# Patient Record
Sex: MALE | Race: WHITE
[De-identification: names, ages, dates, MRNs, and addresses within clinical notes are randomized per-mention and may not be internally consistent; named-entity substitution may affect disease eponyms.]

---

## 2017-01-26 ENCOUNTER — HOSPITAL ENCOUNTER (INPATIENT)
Dept: HOSPITAL 71 - SURG | Age: 32
LOS: 3 days | Discharge: ANOTHER HOSPITAL | DRG: 669 | End: 2017-01-29
Attending: UROLOGY | Admitting: UROLOGY
Payer: MEDICAID

## 2017-01-26 VITALS — RESPIRATION RATE: 22 BRPM | SYSTOLIC BLOOD PRESSURE: 147 MMHG

## 2017-01-26 VITALS — RESPIRATION RATE: 21 BRPM | SYSTOLIC BLOOD PRESSURE: 136 MMHG | TEMPERATURE: 96.9 F

## 2017-01-26 VITALS — SYSTOLIC BLOOD PRESSURE: 138 MMHG | RESPIRATION RATE: 22 BRPM | TEMPERATURE: 97.1 F

## 2017-01-26 VITALS — RESPIRATION RATE: 22 BRPM | TEMPERATURE: 98.1 F | SYSTOLIC BLOOD PRESSURE: 128 MMHG

## 2017-01-26 VITALS — RESPIRATION RATE: 20 BRPM | SYSTOLIC BLOOD PRESSURE: 152 MMHG | TEMPERATURE: 97.4 F

## 2017-01-26 VITALS — SYSTOLIC BLOOD PRESSURE: 183 MMHG | RESPIRATION RATE: 24 BRPM

## 2017-01-26 VITALS — TEMPERATURE: 97.8 F | SYSTOLIC BLOOD PRESSURE: 149 MMHG | RESPIRATION RATE: 20 BRPM

## 2017-01-26 VITALS
WEIGHT: 315 LBS | BODY MASS INDEX: 42.66 KG/M2 | HEIGHT: 72 IN | HEIGHT: 72 IN | BODY MASS INDEX: 42.66 KG/M2 | BODY MASS INDEX: 42.66 KG/M2 | WEIGHT: 315 LBS

## 2017-01-26 VITALS — SYSTOLIC BLOOD PRESSURE: 157 MMHG | RESPIRATION RATE: 20 BRPM

## 2017-01-26 VITALS — RESPIRATION RATE: 27 BRPM | SYSTOLIC BLOOD PRESSURE: 134 MMHG

## 2017-01-26 VITALS — TEMPERATURE: 97.4 F | SYSTOLIC BLOOD PRESSURE: 175 MMHG | RESPIRATION RATE: 18 BRPM

## 2017-01-26 VITALS — RESPIRATION RATE: 19 BRPM | SYSTOLIC BLOOD PRESSURE: 138 MMHG | TEMPERATURE: 97.6 F

## 2017-01-26 VITALS — TEMPERATURE: 98.7 F | SYSTOLIC BLOOD PRESSURE: 144 MMHG | RESPIRATION RATE: 22 BRPM

## 2017-01-26 VITALS — SYSTOLIC BLOOD PRESSURE: 153 MMHG | RESPIRATION RATE: 17 BRPM

## 2017-01-26 DIAGNOSIS — G47.30: ICD-10-CM

## 2017-01-26 DIAGNOSIS — D49.4: ICD-10-CM

## 2017-01-26 DIAGNOSIS — R31.0: ICD-10-CM

## 2017-01-26 DIAGNOSIS — E66.01: ICD-10-CM

## 2017-01-26 DIAGNOSIS — N32.89: Primary | ICD-10-CM

## 2017-01-26 DIAGNOSIS — F17.200: ICD-10-CM

## 2017-01-26 PROCEDURE — 88305 TISSUE EXAM BY PATHOLOGIST: CPT

## 2017-01-26 PROCEDURE — 94799 UNLISTED PULMONARY SVC/PX: CPT

## 2017-01-26 PROCEDURE — 80048 BASIC METABOLIC PNL TOTAL CA: CPT

## 2017-01-26 PROCEDURE — 85025 COMPLETE CBC W/AUTO DIFF WBC: CPT

## 2017-01-26 PROCEDURE — 0TBB8ZZ EXCISION OF BLADDER, VIA NATURAL OR ARTIFICIAL OPENING ENDOSCOPIC: ICD-10-PCS | Performed by: UROLOGY

## 2017-01-26 PROCEDURE — 88307 TISSUE EXAM BY PATHOLOGIST: CPT

## 2017-01-26 PROCEDURE — 94762 N-INVAS EAR/PLS OXIMTRY CONT: CPT

## 2017-01-26 RX ADMIN — HYDROMORPHONE HYDROCHLORIDE PRN MG: 1 INJECTION, SOLUTION INTRAMUSCULAR; INTRAVENOUS; SUBCUTANEOUS at 22:17

## 2017-01-26 RX ADMIN — DOCUSATE SODIUM SCH MG: 100 CAPSULE, LIQUID FILLED ORAL at 19:42

## 2017-01-26 RX ADMIN — TAMSULOSIN HYDROCHLORIDE SCH MG: 0.4 CAPSULE ORAL at 19:41

## 2017-01-26 RX ADMIN — LEVOFLOXACIN ONE MLS/HR: 5 INJECTION, SOLUTION INTRAVENOUS at 11:59

## 2017-01-26 RX ADMIN — OXYBUTYNIN CHLORIDE SCH MG: 5 TABLET ORAL at 19:41

## 2017-01-26 RX ADMIN — HYDROMORPHONE HYDROCHLORIDE PRN MG: 1 INJECTION, SOLUTION INTRAMUSCULAR; INTRAVENOUS; SUBCUTANEOUS at 16:05

## 2017-01-26 RX ADMIN — HYDROMORPHONE HYDROCHLORIDE PRN MG: 1 INJECTION, SOLUTION INTRAMUSCULAR; INTRAVENOUS; SUBCUTANEOUS at 16:17

## 2017-01-27 VITALS — TEMPERATURE: 97.7 F | RESPIRATION RATE: 15 BRPM | SYSTOLIC BLOOD PRESSURE: 145 MMHG

## 2017-01-27 VITALS — RESPIRATION RATE: 16 BRPM | SYSTOLIC BLOOD PRESSURE: 146 MMHG | TEMPERATURE: 97.4 F

## 2017-01-27 VITALS — SYSTOLIC BLOOD PRESSURE: 150 MMHG | TEMPERATURE: 97.4 F | RESPIRATION RATE: 18 BRPM

## 2017-01-27 VITALS — SYSTOLIC BLOOD PRESSURE: 132 MMHG | RESPIRATION RATE: 16 BRPM | TEMPERATURE: 97.5 F

## 2017-01-27 VITALS — TEMPERATURE: 98.1 F | RESPIRATION RATE: 15 BRPM | SYSTOLIC BLOOD PRESSURE: 151 MMHG

## 2017-01-27 VITALS — RESPIRATION RATE: 15 BRPM | SYSTOLIC BLOOD PRESSURE: 159 MMHG | TEMPERATURE: 97.9 F

## 2017-01-27 VITALS — RESPIRATION RATE: 22 BRPM

## 2017-01-27 RX ADMIN — HYDROMORPHONE HYDROCHLORIDE PRN MG: 1 INJECTION, SOLUTION INTRAMUSCULAR; INTRAVENOUS; SUBCUTANEOUS at 19:41

## 2017-01-27 RX ADMIN — OXYBUTYNIN CHLORIDE SCH MG: 5 TABLET ORAL at 19:40

## 2017-01-27 RX ADMIN — NICOTINE POLACRILEX PRN MG: 2 GUM, CHEWING ORAL at 18:01

## 2017-01-27 RX ADMIN — FLUOXETINE SCH MG: 20 CAPSULE ORAL at 07:59

## 2017-01-27 RX ADMIN — STANDARDIZED SENNA CONCENTRATE SCH MG: 8.6 TABLET ORAL at 18:00

## 2017-01-27 RX ADMIN — HYDROMORPHONE HYDROCHLORIDE PRN MG: 1 INJECTION, SOLUTION INTRAMUSCULAR; INTRAVENOUS; SUBCUTANEOUS at 23:26

## 2017-01-27 RX ADMIN — OXYBUTYNIN CHLORIDE SCH MG: 5 TABLET ORAL at 07:59

## 2017-01-27 RX ADMIN — DOCUSATE SODIUM SCH MG: 100 CAPSULE, LIQUID FILLED ORAL at 19:40

## 2017-01-27 RX ADMIN — OXYCODONE HYDROCHLORIDE AND ACETAMINOPHEN PRN TAB: 7.5; 325 TABLET ORAL at 18:53

## 2017-01-27 RX ADMIN — TAMSULOSIN HYDROCHLORIDE SCH MG: 0.4 CAPSULE ORAL at 19:40

## 2017-01-27 RX ADMIN — HYDROMORPHONE HYDROCHLORIDE PRN MG: 1 INJECTION, SOLUTION INTRAMUSCULAR; INTRAVENOUS; SUBCUTANEOUS at 17:24

## 2017-01-27 RX ADMIN — HYDROMORPHONE HYDROCHLORIDE PRN MG: 1 INJECTION, SOLUTION INTRAMUSCULAR; INTRAVENOUS; SUBCUTANEOUS at 00:19

## 2017-01-27 RX ADMIN — OXYCODONE HYDROCHLORIDE AND ACETAMINOPHEN PRN TAB: 7.5; 325 TABLET ORAL at 23:13

## 2017-01-27 RX ADMIN — DOCUSATE SODIUM SCH MG: 100 CAPSULE, LIQUID FILLED ORAL at 07:59

## 2017-01-27 RX ADMIN — HYDROMORPHONE HYDROCHLORIDE PRN MG: 1 INJECTION, SOLUTION INTRAMUSCULAR; INTRAVENOUS; SUBCUTANEOUS at 21:32

## 2017-01-27 RX ADMIN — HYDROMORPHONE HYDROCHLORIDE PRN MG: 1 INJECTION, SOLUTION INTRAMUSCULAR; INTRAVENOUS; SUBCUTANEOUS at 07:13

## 2017-01-27 RX ADMIN — HYDROMORPHONE HYDROCHLORIDE PRN MG: 1 INJECTION, SOLUTION INTRAMUSCULAR; INTRAVENOUS; SUBCUTANEOUS at 13:25

## 2017-01-27 RX ADMIN — HYDROMORPHONE HYDROCHLORIDE PRN MG: 1 INJECTION, SOLUTION INTRAMUSCULAR; INTRAVENOUS; SUBCUTANEOUS at 02:44

## 2017-01-27 RX ADMIN — OXYBUTYNIN CHLORIDE SCH MG: 5 TABLET ORAL at 14:46

## 2017-01-27 RX ADMIN — HYDROMORPHONE HYDROCHLORIDE PRN MG: 1 INJECTION, SOLUTION INTRAMUSCULAR; INTRAVENOUS; SUBCUTANEOUS at 09:26

## 2017-01-27 RX ADMIN — HYDROMORPHONE HYDROCHLORIDE PRN MG: 1 INJECTION, SOLUTION INTRAMUSCULAR; INTRAVENOUS; SUBCUTANEOUS at 11:17

## 2017-01-27 RX ADMIN — HYDROMORPHONE HYDROCHLORIDE PRN MG: 1 INJECTION, SOLUTION INTRAMUSCULAR; INTRAVENOUS; SUBCUTANEOUS at 04:52

## 2017-01-27 RX ADMIN — HYDROMORPHONE HYDROCHLORIDE PRN MG: 1 INJECTION, SOLUTION INTRAMUSCULAR; INTRAVENOUS; SUBCUTANEOUS at 15:22

## 2017-01-27 RX ADMIN — LEVOFLOXACIN SCH MG: 500 TABLET, FILM COATED ORAL at 09:30

## 2017-01-28 VITALS — RESPIRATION RATE: 20 BRPM | TEMPERATURE: 98.1 F | SYSTOLIC BLOOD PRESSURE: 148 MMHG

## 2017-01-28 VITALS — TEMPERATURE: 97.3 F | RESPIRATION RATE: 20 BRPM | SYSTOLIC BLOOD PRESSURE: 159 MMHG

## 2017-01-28 VITALS — TEMPERATURE: 97.4 F | SYSTOLIC BLOOD PRESSURE: 132 MMHG | RESPIRATION RATE: 16 BRPM

## 2017-01-28 VITALS — TEMPERATURE: 97.7 F | RESPIRATION RATE: 18 BRPM | SYSTOLIC BLOOD PRESSURE: 117 MMHG

## 2017-01-28 VITALS — SYSTOLIC BLOOD PRESSURE: 154 MMHG | TEMPERATURE: 97.5 F | RESPIRATION RATE: 18 BRPM

## 2017-01-28 VITALS — RESPIRATION RATE: 18 BRPM | TEMPERATURE: 97.5 F | SYSTOLIC BLOOD PRESSURE: 154 MMHG

## 2017-01-28 RX ADMIN — OXYCODONE HYDROCHLORIDE AND ACETAMINOPHEN PRN TAB: 7.5; 325 TABLET ORAL at 03:09

## 2017-01-28 RX ADMIN — HYDROMORPHONE HYDROCHLORIDE PRN MG: 1 INJECTION, SOLUTION INTRAMUSCULAR; INTRAVENOUS; SUBCUTANEOUS at 03:34

## 2017-01-28 RX ADMIN — HYDROMORPHONE HYDROCHLORIDE PRN MG: 1 INJECTION, SOLUTION INTRAMUSCULAR; INTRAVENOUS; SUBCUTANEOUS at 09:53

## 2017-01-28 RX ADMIN — OXYBUTYNIN CHLORIDE SCH MG: 5 TABLET ORAL at 16:12

## 2017-01-28 RX ADMIN — OXYCODONE AND ACETAMINOPHEN PRN TAB: 10; 325 TABLET ORAL at 08:49

## 2017-01-28 RX ADMIN — FLUOXETINE SCH MG: 20 CAPSULE ORAL at 08:49

## 2017-01-28 RX ADMIN — DOCUSATE SODIUM SCH MG: 100 CAPSULE, LIQUID FILLED ORAL at 08:48

## 2017-01-28 RX ADMIN — HYDROMORPHONE HYDROCHLORIDE PRN MG: 1 INJECTION, SOLUTION INTRAMUSCULAR; INTRAVENOUS; SUBCUTANEOUS at 13:57

## 2017-01-28 RX ADMIN — NICOTINE POLACRILEX PRN MG: 2 GUM, CHEWING ORAL at 08:49

## 2017-01-28 RX ADMIN — OXYCODONE AND ACETAMINOPHEN PRN TAB: 10; 325 TABLET ORAL at 19:47

## 2017-01-28 RX ADMIN — LEVOFLOXACIN SCH MG: 500 TABLET, FILM COATED ORAL at 08:48

## 2017-01-28 RX ADMIN — TAMSULOSIN HYDROCHLORIDE SCH MG: 0.4 CAPSULE ORAL at 19:40

## 2017-01-28 RX ADMIN — HYDROMORPHONE HYDROCHLORIDE PRN MG: 1 INJECTION, SOLUTION INTRAMUSCULAR; INTRAVENOUS; SUBCUTANEOUS at 20:35

## 2017-01-28 RX ADMIN — HYDROMORPHONE HYDROCHLORIDE PRN MG: 1 INJECTION, SOLUTION INTRAMUSCULAR; INTRAVENOUS; SUBCUTANEOUS at 01:30

## 2017-01-28 RX ADMIN — OXYCODONE AND ACETAMINOPHEN PRN TAB: 10; 325 TABLET ORAL at 14:44

## 2017-01-28 RX ADMIN — STANDARDIZED SENNA CONCENTRATE SCH MG: 8.6 TABLET ORAL at 08:48

## 2017-01-28 RX ADMIN — HYDROMORPHONE HYDROCHLORIDE PRN MG: 1 INJECTION, SOLUTION INTRAMUSCULAR; INTRAVENOUS; SUBCUTANEOUS at 22:28

## 2017-01-28 RX ADMIN — HYDROMORPHONE HYDROCHLORIDE PRN MG: 1 INJECTION, SOLUTION INTRAMUSCULAR; INTRAVENOUS; SUBCUTANEOUS at 18:02

## 2017-01-28 RX ADMIN — HYDROMORPHONE HYDROCHLORIDE PRN MG: 1 INJECTION, SOLUTION INTRAMUSCULAR; INTRAVENOUS; SUBCUTANEOUS at 16:12

## 2017-01-28 RX ADMIN — OXYBUTYNIN CHLORIDE SCH MG: 5 TABLET ORAL at 08:48

## 2017-01-28 RX ADMIN — MAGNESIUM HYDROXIDE SCH ML: 400 SUSPENSION ORAL at 08:55

## 2017-01-28 RX ADMIN — HYDROMORPHONE HYDROCHLORIDE PRN MG: 1 INJECTION, SOLUTION INTRAMUSCULAR; INTRAVENOUS; SUBCUTANEOUS at 11:47

## 2017-01-28 RX ADMIN — NICOTINE POLACRILEX PRN MG: 2 GUM, CHEWING ORAL at 03:11

## 2017-01-28 RX ADMIN — HYDROMORPHONE HYDROCHLORIDE PRN MG: 1 INJECTION, SOLUTION INTRAMUSCULAR; INTRAVENOUS; SUBCUTANEOUS at 05:49

## 2017-01-28 RX ADMIN — DOCUSATE SODIUM SCH MG: 100 CAPSULE, LIQUID FILLED ORAL at 19:40

## 2017-01-28 RX ADMIN — OXYBUTYNIN CHLORIDE SCH MG: 5 TABLET ORAL at 19:40

## 2017-01-28 RX ADMIN — HYDROMORPHONE HYDROCHLORIDE PRN MG: 1 INJECTION, SOLUTION INTRAMUSCULAR; INTRAVENOUS; SUBCUTANEOUS at 07:41

## 2017-01-29 VITALS — SYSTOLIC BLOOD PRESSURE: 154 MMHG | RESPIRATION RATE: 20 BRPM | TEMPERATURE: 97.9 F

## 2017-01-29 VITALS — TEMPERATURE: 97.4 F | SYSTOLIC BLOOD PRESSURE: 136 MMHG | RESPIRATION RATE: 16 BRPM

## 2017-01-29 VITALS — SYSTOLIC BLOOD PRESSURE: 163 MMHG | RESPIRATION RATE: 20 BRPM | TEMPERATURE: 97.7 F

## 2017-01-29 VITALS — SYSTOLIC BLOOD PRESSURE: 142 MMHG | TEMPERATURE: 97.5 F | RESPIRATION RATE: 18 BRPM

## 2017-01-29 VITALS — TEMPERATURE: 97.7 F | SYSTOLIC BLOOD PRESSURE: 163 MMHG | RESPIRATION RATE: 16 BRPM

## 2017-01-29 VITALS — RESPIRATION RATE: 16 BRPM

## 2017-01-29 VITALS — RESPIRATION RATE: 20 BRPM

## 2017-01-29 RX ADMIN — OXYBUTYNIN CHLORIDE SCH MG: 5 TABLET ORAL at 09:00

## 2017-01-29 RX ADMIN — HYDROMORPHONE HYDROCHLORIDE PRN MG: 1 INJECTION, SOLUTION INTRAMUSCULAR; INTRAVENOUS; SUBCUTANEOUS at 09:11

## 2017-01-29 RX ADMIN — HYDROMORPHONE HYDROCHLORIDE PRN MG: 1 INJECTION, SOLUTION INTRAMUSCULAR; INTRAVENOUS; SUBCUTANEOUS at 11:34

## 2017-01-29 RX ADMIN — HYDROMORPHONE HYDROCHLORIDE PRN MG: 1 INJECTION, SOLUTION INTRAMUSCULAR; INTRAVENOUS; SUBCUTANEOUS at 10:37

## 2017-01-29 RX ADMIN — HYDROMORPHONE HYDROCHLORIDE PRN MG: 1 INJECTION, SOLUTION INTRAMUSCULAR; INTRAVENOUS; SUBCUTANEOUS at 06:17

## 2017-01-29 RX ADMIN — OXYCODONE AND ACETAMINOPHEN PRN TAB: 10; 325 TABLET ORAL at 01:57

## 2017-01-29 RX ADMIN — DOCUSATE SODIUM SCH MG: 100 CAPSULE, LIQUID FILLED ORAL at 09:00

## 2017-01-29 RX ADMIN — LEVOFLOXACIN SCH MG: 500 TABLET, FILM COATED ORAL at 09:00

## 2017-01-29 RX ADMIN — MAGNESIUM HYDROXIDE SCH ML: 400 SUSPENSION ORAL at 09:00

## 2017-01-29 RX ADMIN — STANDARDIZED SENNA CONCENTRATE SCH MG: 8.6 TABLET ORAL at 09:00

## 2017-01-29 RX ADMIN — HYDROMORPHONE HYDROCHLORIDE PRN MG: 1 INJECTION, SOLUTION INTRAMUSCULAR; INTRAVENOUS; SUBCUTANEOUS at 04:12

## 2017-01-29 RX ADMIN — HYDROMORPHONE HYDROCHLORIDE PRN MG: 1 INJECTION, SOLUTION INTRAMUSCULAR; INTRAVENOUS; SUBCUTANEOUS at 07:14

## 2017-01-29 RX ADMIN — HYDROMORPHONE HYDROCHLORIDE PRN MG: 1 INJECTION, SOLUTION INTRAMUSCULAR; INTRAVENOUS; SUBCUTANEOUS at 00:58

## 2017-01-29 RX ADMIN — FLUOXETINE SCH MG: 20 CAPSULE ORAL at 09:00

## 2018-07-25 ENCOUNTER — OFFICE VISIT CONVERTED (OUTPATIENT)
Dept: ONCOLOGY | Facility: HOSPITAL | Age: 33
End: 2018-07-25
Attending: INTERNAL MEDICINE

## 2019-01-17 ENCOUNTER — HOSPITAL ENCOUNTER (OUTPATIENT)
Dept: OTHER | Facility: HOSPITAL | Age: 34
Discharge: HOME | End: 2019-01-17
Attending: UROLOGY

## 2019-01-17 LAB
CREAT BLD-MCNC: 1.5 MG/DL (ref 0.6–1.4)
GFR SERPLBLD BASED ON 1.73 SQ M-ARVRAT: >60 ML/MIN/{1.73_M2}

## 2019-03-21 ENCOUNTER — OFFICE VISIT CONVERTED (OUTPATIENT)
Dept: SURGERY | Facility: CLINIC | Age: 34
End: 2019-03-21
Attending: SURGERY

## 2019-04-01 ENCOUNTER — HOSPITAL ENCOUNTER (OUTPATIENT)
Dept: PERIOP | Facility: HOSPITAL | Age: 34
Setting detail: HOSPITAL OUTPATIENT SURGERY
Discharge: HOME | End: 2019-04-03
Attending: SURGERY

## 2019-04-01 LAB
ANION GAP SERPL CALC-SCNC: 17 MMOL/L (ref 8–19)
BASE EXCESS BLD CALC-SCNC: -7.2 MMOL/L
BASE EXCESS BLD CALC-SCNC: -7.9 MMOL/L
BASE EXCESS BLD CALC-SCNC: -8.6 MMOL/L
BASOPHILS # BLD AUTO: 0.04 10*3/UL (ref 0–0.2)
BASOPHILS NFR BLD AUTO: 0.6 % (ref 0–3)
BNP SERPL-MCNC: 46 PG/ML (ref 0–450)
BUN SERPL-MCNC: 21 MG/DL (ref 5–25)
BUN/CREAT SERPL: 14 {RATIO} (ref 6–20)
CALCIUM SERPL-MCNC: 8.8 MG/DL (ref 8.7–10.4)
CHLORIDE BLDA-SCNC: 103 MMOL/L (ref 98–106)
CHLORIDE SERPL-SCNC: 104 MMOL/L (ref 99–111)
COHGB MFR BLD: 2.2 % (ref 0–1.5)
COHGB MFR BLD: 2.6 % (ref 0–1.5)
COHGB MFR BLD: 2.8 % (ref 0–1.5)
CONV ABS IMM GRAN: 0.05 10*3/UL (ref 0–0.2)
CONV ALLEN'S TEST: ABNORMAL
CONV CO2: 22 MMOL/L (ref 22–32)
CONV E PRESSURE: 8 CM[H2O]
CONV E PRESSURE: 8 CM[H2O]
CONV FHHB: 5.3 % (ref 0–5)
CONV FHHB: 6 % (ref 0–5)
CONV FHHB: 8.7 % (ref 0–5)
CONV FIO2: 25 % (ref 21–100)
CONV FIO2: 35 % (ref 21–100)
CONV FIO2: 40 % (ref 21–100)
CONV I PRESSURE: 16 CM[H2O]
CONV IMMATURE GRAN: 0.7 % (ref 0–1.8)
CONV POC IONIZED CALCIUM: 1.11 MMOL/L (ref 1.13–1.32)
CONV SITE: ABNORMAL
CREAT UR-MCNC: 1.49 MG/DL (ref 0.7–1.2)
D-LACTATE SERPL-SCNC: 0.96 MMOL/L (ref 0.5–2)
DEPRECATED RDW RBC AUTO: 50.7 FL (ref 35.1–43.9)
EOSINOPHIL # BLD AUTO: 0.16 10*3/UL (ref 0–0.7)
EOSINOPHIL # BLD AUTO: 2.2 % (ref 0–7)
ERYTHROCYTE [DISTWIDTH] IN BLOOD BY AUTOMATED COUNT: 14.8 % (ref 11.6–14.4)
GFR SERPLBLD BASED ON 1.73 SQ M-ARVRAT: >60 ML/MIN/{1.73_M2}
GLUCOSE BLD-MCNC: 103 MG/DL (ref 70–99)
GLUCOSE SERPL-MCNC: 99 MG/DL (ref 70–99)
HBA1C MFR BLD: 13.7 % (ref 13.8–16.4)
HBA1C MFR BLD: 13.7 G/DL (ref 14–18)
HBA1C MFR BLD: 14.4 % (ref 13.8–16.4)
HBA1C MFR BLD: 14.6 % (ref 13.8–16.4)
HCO3 BLDA-SCNC: 20.8 MMOL/L (ref 22–26)
HCO3 BLDA-SCNC: 21.3 MMOL/L (ref 22–26)
HCO3 BLDA-SCNC: 22.4 MMOL/L (ref 22–26)
HCT VFR BLD AUTO: 43.4 % (ref 42–52)
LABORATORY COMMENT REPORT: ABNORMAL
LITERS PER MINUTE: 10 L/MIN
LYMPHOCYTES # BLD AUTO: 2.06 10*3/UL (ref 1–5)
Lab: ABNORMAL
MCH RBC QN AUTO: 29.9 PG (ref 27–31)
MCHC RBC AUTO-ENTMCNC: 31.6 G/DL (ref 33–37)
MCV RBC AUTO: 94.8 FL (ref 80–96)
METHGB MFR BLD: 0 % (ref 0–1.5)
METHGB MFR BLD: 0.3 % (ref 0–1.5)
METHGB MFR BLD: 0.3 % (ref 0–1.5)
MONOCYTES # BLD AUTO: 0.47 10*3/UL (ref 0.2–1.2)
MONOCYTES NFR BLD AUTO: 6.5 % (ref 3–10)
NEUTROPHILS # BLD AUTO: 4.46 10*3/UL (ref 2–8)
NEUTROPHILS NFR BLD AUTO: 61.5 % (ref 30–85)
NRBC CBCN: 0 % (ref 0–0.7)
OSMOLALITY SERPL CALC.SUM OF ELEC: 289 MOSM/KG (ref 273–304)
OXYHGB MFR BLD: 89.1 % (ref 94–98)
OXYHGB MFR BLD: 90.9 % (ref 94–98)
OXYHGB MFR BLD: 91.8 % (ref 94–98)
PCO2 BLD: 52.3 MM[HG] (ref 35–45)
PCO2 BLD: 63.8 MM[HG] (ref 35–45)
PCO2 BLD: 67.4 MM[HG] (ref 35–45)
PH UR: 7.14 [PH] (ref 7.35–7.45)
PH UR: 7.14 [PH] (ref 7.35–7.45)
PH UR: 7.22 [PH] (ref 7.35–7.45)
PLATELET # BLD AUTO: 247 10*3/UL (ref 130–400)
PMV BLD AUTO: 9.2 FL (ref 9.4–12.4)
PO2 BLD: 223 MM[HG] (ref 0–500)
PO2 BLD: 241 MM[HG] (ref 0–500)
PO2 BLD: 260 MM[HG] (ref 0–500)
PO2 BLD: 64.9 MM[HG] (ref 80–100)
PO2 BLD: 84.2 MM[HG] (ref 80–100)
PO2 BLD: 89.3 MM[HG] (ref 80–100)
POTASSIUM BLDA-SCNC: 5.4 MMOL/L (ref 3.5–5)
POTASSIUM SERPL-SCNC: 4.6 MMOL/L (ref 3.5–5.3)
RBC # BLD AUTO: 4.58 10*6/UL (ref 4.7–6.1)
SAO2 % BLDCOA: 91.1 % (ref 95–99)
SAO2 % BLDCOA: 93.8 % (ref 95–99)
SAO2 % BLDCOA: 94.5 % (ref 95–99)
SODIUM BLD-SCNC: 135.1 MMOL/L (ref 136–146)
SODIUM SERPL-SCNC: 138 MMOL/L (ref 135–147)
SPECIMEN SOURCE: ABNORMAL
SPO2: 92 MM[HG] (ref 21–100)
SPO2: 94 MM[HG] (ref 21–100)
SPO2: 95 MM[HG] (ref 21–100)
T4 FREE SERPL-MCNC: 1.2 NG/DL (ref 0.9–1.8)
TSH SERPL-ACNC: 1.19 M[IU]/L (ref 0.27–4.2)
VARIANT LYMPHS NFR BLD MANUAL: 28.5 % (ref 20–45)
WBC # BLD AUTO: 7.24 10*3/UL (ref 4.8–10.8)

## 2019-04-02 LAB
ANION GAP SERPL CALC-SCNC: 16 MMOL/L (ref 8–19)
BUN SERPL-MCNC: 23 MG/DL (ref 5–25)
BUN/CREAT SERPL: 14 {RATIO} (ref 6–20)
CALCIUM SERPL-MCNC: 8.6 MG/DL (ref 8.7–10.4)
CHLORIDE SERPL-SCNC: 101 MMOL/L (ref 99–111)
CONV CO2: 20 MMOL/L (ref 22–32)
CREAT UR-MCNC: 1.61 MG/DL (ref 0.7–1.2)
GFR SERPLBLD BASED ON 1.73 SQ M-ARVRAT: 56 ML/MIN/{1.73_M2}
GLUCOSE SERPL-MCNC: 101 MG/DL (ref 70–99)
OSMOLALITY SERPL CALC.SUM OF ELEC: 280 MOSM/KG (ref 273–304)
POTASSIUM SERPL-SCNC: 4.4 MMOL/L (ref 3.5–5.3)
SODIUM SERPL-SCNC: 133 MMOL/L (ref 135–147)

## 2019-04-03 LAB
BASE EXCESS BLD CALC-SCNC: -9.2 MMOL/L
COHGB MFR BLD: 1.4 % (ref 0–1.5)
CONV ALLEN'S TEST: ABNORMAL
CONV E PRESSURE: 8 CM[H2O]
CONV FHHB: 5.7 % (ref 0–5)
CONV FIO2: 25 % (ref 21–100)
CONV SITE: ABNORMAL
HBA1C MFR BLD: 12.8 % (ref 13.8–16.4)
HCO3 BLDA-SCNC: 20.6 MMOL/L (ref 22–26)
Lab: ABNORMAL
METHGB MFR BLD: 0.1 % (ref 0–1.5)
OXYHGB MFR BLD: 92.8 % (ref 94–98)
PCO2 BLD: 63.5 MM[HG] (ref 35–45)
PH UR: 7.13 [PH] (ref 7.35–7.45)
PO2 BLD: 304 MM[HG] (ref 0–500)
PO2 BLD: 76.1 MM[HG] (ref 80–100)
SAO2 % BLDCOA: 94.2 % (ref 95–99)
SPECIMEN SOURCE: ABNORMAL

## 2019-04-17 ENCOUNTER — HOSPITAL ENCOUNTER (OUTPATIENT)
Dept: ONCOLOGY | Facility: HOSPITAL | Age: 34
Discharge: HOME | End: 2019-04-17
Attending: NURSE PRACTITIONER

## 2019-04-17 ENCOUNTER — OFFICE VISIT CONVERTED (OUTPATIENT)
Dept: ONCOLOGY | Facility: HOSPITAL | Age: 34
End: 2019-04-17
Attending: NURSE PRACTITIONER

## 2019-04-17 LAB
ALBUMIN SERPL-MCNC: 4 G/DL (ref 3.5–5)
ALBUMIN/GLOB SERPL: 1.2 {RATIO} (ref 1.4–2.6)
ALP SERPL-CCNC: 55 U/L (ref 53–128)
ALT SERPL-CCNC: 30 U/L (ref 10–40)
ANION GAP SERPL CALC-SCNC: 20 MMOL/L (ref 8–19)
AST SERPL-CCNC: 17 U/L (ref 15–50)
BILIRUB SERPL-MCNC: 0.15 MG/DL (ref 0.2–1.3)
BUN SERPL-MCNC: 27 MG/DL (ref 5–25)
BUN/CREAT SERPL: 20 {RATIO} (ref 6–20)
CALCIUM SERPL-MCNC: 9.6 MG/DL (ref 8.7–10.4)
CHLORIDE SERPL-SCNC: 100 MMOL/L (ref 99–111)
CONV CO2: 23 MMOL/L (ref 22–32)
CONV TOTAL PROTEIN: 7.4 G/DL (ref 6.3–8.2)
CREAT UR-MCNC: 1.37 MG/DL (ref 0.7–1.2)
GFR SERPLBLD BASED ON 1.73 SQ M-ARVRAT: >60 ML/MIN/{1.73_M2}
GLOBULIN UR ELPH-MCNC: 3.4 G/DL (ref 2–3.5)
GLUCOSE SERPL-MCNC: 122 MG/DL (ref 70–99)
OSMOLALITY SERPL CALC.SUM OF ELEC: 294 MOSM/KG (ref 273–304)
POTASSIUM SERPL-SCNC: 4.3 MMOL/L (ref 3.5–5.3)
SODIUM SERPL-SCNC: 139 MMOL/L (ref 135–147)

## 2019-11-12 ENCOUNTER — HOSPITAL ENCOUNTER (OUTPATIENT)
Dept: OTHER | Facility: HOSPITAL | Age: 34
Discharge: HOME | End: 2019-11-12
Attending: UROLOGY

## 2019-11-12 LAB
CREAT BLD-MCNC: 1.7 MG/DL (ref 0.6–1.4)
GFR SERPLBLD BASED ON 1.73 SQ M-ARVRAT: 52 ML/MIN/{1.73_M2}

## 2020-06-09 ENCOUNTER — HOSPITAL ENCOUNTER (OUTPATIENT)
Dept: CT IMAGING | Facility: HOSPITAL | Age: 35
Discharge: HOME | End: 2020-06-09
Attending: UROLOGY

## 2020-06-09 LAB
CREAT BLD-MCNC: 1.3 MG/DL (ref 0.6–1.4)
GFR SERPLBLD BASED ON 1.73 SQ M-ARVRAT: >60 ML/MIN/{1.73_M2}

## 2021-01-08 ENCOUNTER — HOSPITAL ENCOUNTER (OUTPATIENT)
Dept: OTHER | Facility: HOSPITAL | Age: 36
Discharge: HOME | End: 2021-01-08
Attending: FAMILY MEDICINE

## 2021-01-08 LAB
ALBUMIN SERPL-MCNC: 4 G/DL (ref 3.5–5)
ALBUMIN/GLOB SERPL: 1.4 {RATIO} (ref 1.4–2.6)
ALP SERPL-CCNC: 172 U/L (ref 53–128)
ALT SERPL-CCNC: 48 U/L (ref 10–40)
ANION GAP SERPL CALC-SCNC: 17 MMOL/L (ref 8–19)
AST SERPL-CCNC: 61 U/L (ref 15–50)
BASOPHILS # BLD AUTO: 0.06 10*3/UL (ref 0–0.2)
BASOPHILS NFR BLD AUTO: 0.6 % (ref 0–3)
BILIRUB SERPL-MCNC: 0.47 MG/DL (ref 0.2–1.3)
BUN SERPL-MCNC: 17 MG/DL (ref 5–25)
BUN/CREAT SERPL: 13 {RATIO} (ref 6–20)
CALCIUM SERPL-MCNC: 7.7 MG/DL (ref 8.7–10.4)
CHLORIDE SERPL-SCNC: 107 MMOL/L (ref 99–111)
CONV ABS IMM GRAN: 0.06 10*3/UL (ref 0–0.2)
CONV CO2: 19 MMOL/L (ref 22–32)
CONV IMMATURE GRAN: 0.6 % (ref 0–1.8)
CONV TOTAL PROTEIN: 6.9 G/DL (ref 6.3–8.2)
CREAT UR-MCNC: 1.32 MG/DL (ref 0.7–1.2)
DEPRECATED RDW RBC AUTO: 53 FL (ref 35.1–43.9)
EOSINOPHIL # BLD AUTO: 0.29 10*3/UL (ref 0–0.7)
EOSINOPHIL # BLD AUTO: 2.9 % (ref 0–7)
ERYTHROCYTE [DISTWIDTH] IN BLOOD BY AUTOMATED COUNT: 14.9 % (ref 11.6–14.4)
GFR SERPLBLD BASED ON 1.73 SQ M-ARVRAT: >60 ML/MIN/{1.73_M2}
GLOBULIN UR ELPH-MCNC: 2.9 G/DL (ref 2–3.5)
GLUCOSE SERPL-MCNC: 180 MG/DL (ref 70–99)
HCT VFR BLD AUTO: 36.6 % (ref 42–52)
HGB BLD-MCNC: 11.3 G/DL (ref 14–18)
LYMPHOCYTES # BLD AUTO: 1.66 10*3/UL (ref 1–5)
LYMPHOCYTES NFR BLD AUTO: 16.9 % (ref 20–45)
MCH RBC QN AUTO: 31 PG (ref 27–31)
MCHC RBC AUTO-ENTMCNC: 30.9 G/DL (ref 33–37)
MCV RBC AUTO: 100.3 FL (ref 80–96)
MONOCYTES # BLD AUTO: 0.44 10*3/UL (ref 0.2–1.2)
MONOCYTES NFR BLD AUTO: 4.5 % (ref 3–10)
NEUTROPHILS # BLD AUTO: 7.34 10*3/UL (ref 2–8)
NEUTROPHILS NFR BLD AUTO: 74.5 % (ref 30–85)
NRBC CBCN: 0 % (ref 0–0.7)
OSMOLALITY SERPL CALC.SUM OF ELEC: 296 MOSM/KG (ref 273–304)
PLATELET # BLD AUTO: 289 10*3/UL (ref 130–400)
PMV BLD AUTO: 10.5 FL (ref 9.4–12.4)
POTASSIUM SERPL-SCNC: 3.4 MMOL/L (ref 3.5–5.3)
RBC # BLD AUTO: 3.65 10*6/UL (ref 4.7–6.1)
SODIUM SERPL-SCNC: 140 MMOL/L (ref 135–147)
TSH SERPL-ACNC: 4.42 M[IU]/L (ref 0.27–4.2)
WBC # BLD AUTO: 9.85 10*3/UL (ref 4.8–10.8)

## 2021-05-15 VITALS — WEIGHT: 315 LBS | HEIGHT: 72 IN | RESPIRATION RATE: 14 BRPM | BODY MASS INDEX: 42.66 KG/M2

## 2021-05-28 VITALS
WEIGHT: 315 LBS | TEMPERATURE: 98.2 F | HEIGHT: 66 IN | BODY MASS INDEX: 50.62 KG/M2 | SYSTOLIC BLOOD PRESSURE: 149 MMHG | DIASTOLIC BLOOD PRESSURE: 92 MMHG | OXYGEN SATURATION: 96 % | RESPIRATION RATE: 20 BRPM | HEART RATE: 79 BPM

## 2021-05-28 VITALS
WEIGHT: 315 LBS | HEART RATE: 91 BPM | HEIGHT: 66 IN | OXYGEN SATURATION: 98 % | TEMPERATURE: 97.8 F | BODY MASS INDEX: 50.62 KG/M2

## 2021-05-28 NOTE — PROGRESS NOTES
Patient: SEVERS,ERICH     Acct: [id number]     Report: #[id number]  UNIT #: [id number]     : 1985    Encounter Date:2018  PRIMARY CARE: ALONZO YARBROUGH  ***Signed***  --------------------------------------------------------------------------------------------------------------------  Visit Type      Established Patient Visit            Chief Complaint      BLADDER CANCER            Referring Provider/Copies To      Referring Provider:  SUKHDEEP POSADA            Allergies      Coded Allergies:             NO KNOWN ALLERGIES (Unverified , 18)            Medications      Last Reconciled on 18 18:52 by JAKE DRISCOLL MD      Folic Acid (Folic Acid*) 1 Mg Tablet      1 MG PO QDAY, #30 TAB 0 Refills         Reported         17       traZODone HCl (traZODone HCl*) 100 Mg Tablet      100 MG PO HS Y for SLEEP, #30 TAB 0 Refills         Reported         5/3/17       FLUoxetine HCl (FLUoxetine HCl) 60 Mg Tablet      60 MG PO QDAY, TAB         Reported         17            History and Present Illness      Past Oncology Illness History      -. Final path: high grade papillary urothelial cancer. Non-    invasive. Muscular propria not indentified. s/p bladder resection. Stage pTa.      -2017. CT CAP: 1. Lobulated urinary bladder mass measuring up to     9.8 cm. This finding is highly concerning for transitional cell carcinoma of     the urinary bladder. There is no clear evidence of extension beyond the     external surface of the urinary bladder and there is no evidence of distal     metastasis in the abdomen or the pelvis or the lung bases.  2. Hepatic     steatosis.3. Exophytic hemorrhagic left kidney cyst.      -May 3, 2017 -.  Completed 4 cycles of cisplatin and gemcitabine      -.  Cholecystectomy.  Pathology showed inflammation.       -2017. DISIDA scan - Normal hepatobiliary scan and gallbladder     ejection  fraction.            This is a very pleasant 31-year-old gentleman who presents to oncology clinic     for evaluation after bladder cancer surgery.  Patient presented after TURBT     with perineal ureterotomy for bladder cancer.  Pathology demonstrated high-    grade T1 with muscle in the specimen.  Patient is status post RAR C with     orthotopic ileal neobladder on February .  Status post cystogram on     March 9.            Status post urinary bladder and prostate cystoprostatectomy.  Left ureter     negative for carcinoma.  Right ureter negative for carcinoma.  Urinary bladder     and prostate cystoprostatectomy.  Tumor site anterior and posterior walls     trigone and dome.  Tumor size 4.2 x 3.0 x 1.6 cm.  Tumor histologic type     invasive urothelial carcinoma.  High-grade.  Fungating papillary mass.  Tumor     invades muscularis propria.  Lymphovascular invasion not identified.  Lymph     node 17 lymph nodes negative for carcinoma.  Pathological tumor staging pT2 N0.      Left external iliac lymph node lymphadenectomy.  3 lymph nodes negative for     metastatic carcinoma.  Left obturator node 2 lymph nodes negative for     metastatic carcinoma.  Left presacral lymph node lymphadenectomy.  Negative for     metastatic carcinoma.            HPI - Oncology Interim      Patient described abdominal pain has improved. Feels like crampy pain. Noticed     it since patient was diagnosed. This is a intermittent pain, worsened with     activities of daily living, and no associated bone or muscle pain.            Clinical Trial Participant      No            ECOG Performance Status      1            PAST, FAMILY   Past Medical History      Past Medical History:  No Diabetes Type 1, No Diabetes Type 2, No Thyroid     Disease, No COPD, No Emphysema, No Hypertension, No Stroke, No High Cholesterol    , No Heart Attack, No Bleeding Condition, No Low or High RBC Count, No Low or     High WBC Count, No Low or High  Platelet Coun, No Hepatitis, No Kidney Disease,     Depression, No Alzheimer's Disease, No Mental Disease, No Seizures, No Arthritis    , No Osteoporosis, No Osteopenia, No Sleep apnea, No Liver Disease, No STD, No     Enlarged Prostate, Other (fatty liver and gallstones, dehydration )      Hematology/oncology:  REPORTS HX OF: Anemia, Bladder Cancer, DENIES HX OF:     Previous Treatment for CA, Blood cancer, Brain cancer, Breast cancer,     Coagulopathy, Colon Cancer, Colorectal cancer, Endocrine cancer, Eye cancer, GI     cancer,  cancer, Kidney cancer, Leukemia, Leukocytosis, Leukopenia, Liver     cancer, Lung cancer, Lymphoma, Musculoskeletal cancer, Myeloma, Neurologic     cancer, Oral cancer, Pancreatic Cancer, Prostate cancer, Skin cancer, Stomach     cancer, Testicular cancer, Thrombocytopenia, Thyroid cancer, Other cancer     history, Other hematologic history      Genetic/metabolic:  DENIES HX OF: Cystic fibrosis, Down syndrome, Other genetic     history, Other metabolic history            Past Surgical History      REPORTS HX OF: Other Past Surgical Hx (bladder removed and prostate removed),     DENIES HX OF: Cataract extraction, Thyroid surgery, Lung biopsy, CABG surgery,     Coronary stent, Valve replacement, Appendectomy, Cholecystectomy, Splenectomy,     Bladder surgery, Nephrectomy, Joint replacement, Frature repair, Skin cancer     removal, Melanoma excision, Spinal surgery, Breast biopsy, Mastectomy, bilateral    , Mastectomy, right, Mastectomy, left, Hysterectomy, Peg Tube Placement, VAD     Placement, Biopsy            Family History      DENIES HX OF: Anemia, Blood disorders, Blood Cancer, Breast cancer, Cervical     cancer, Coagulopathy, Colorectal cancer, Endocrine Cancer, Eye Cancer, GI Cancer    ,  Cancer, Kidney Cancer, Leukemia, Leukocytosis, Leukopenia, Liver Cancer,     Lung cancer, Lymphoma, Melanoma, Musculoskeletal Cancer, Myeloma, Neurologic     Cancer, Oral Cancer, Ovarian  cancer, Prostate cancer, Skin Cancer, Stomach     Cancer, Testicular Cancer, Thrombocytopenia, Thyroid cancer, Uterine cancer,     Other Cancer History, Other Hematology History            Social History      Lives independently:  Yes      Occupation:  unemployed            Tobacco Use      Tobacco status:  Never smoker            Alcohol Use      Alcohol intake:  None            Substance Use      Substance use:  Denies use            REVIEW OF SYSTEMS      General:  Denies: Appetite change, Excessive sweating, Fatigue, Fever, Night     sweats, Weight gain, Weight loss, Other      Ears, nose, mouth, throat:  Denies: Headache, Seizures, Visual Changes, Hearing     loss, Sinus Congestion, Hoarseness, Sore throat, Other      Cardiovascular:  Denies: Chest pain, Irregular heartbeat, Palpitations, Swollen     ankles/legs, Other      Respiratory:  Denies: Chest pain, Shortness of Air, Productive cough, Coughing     blood, Other      Gastrointestinal:  Complains of: Nausea, Denies: Vomiting, Problem swallowing,     Frequent heartburn, Constipation, Diarrhea, Tarry stools, Bloody stools, Unable     to control bowels, Other      Kidney/Bladder:  Denies: Painful Urination, Blood in Urine, Incontinence,     Frequent Urination, Decreased Urine Stream, Other      Musculoskeletal:  Denies: New Back pain, Leg Cramps, Painful Joints, Swollen     Joints, Muscle Pain, Muscle weakness, Other      Skin:  DENIES: Bruises Easily, Hair changes, Lesions/changes in moles, Nail     changes, Pigment changes, Rash, Other      Neurological:  Denies: Dizziness, Fainting, Numbness\Tingling, Paralysis,     Seizures, Other      Psychiatric:  Complains of: AAO X 3, Denies: Anxiety, Panic attacks, Depression    , Memory loss, Other      Endocrine:  DiabetesThyroid DisorderOsteoporosisEndocrine Other      Hematologic/lymphatic:  Denies: Bruising, Bleeding, Enlarged Lymph Nodes,     Recurrent infections, Other            VITAL SIGNS,PAIN/FATIGUE SCORE       Vitals      Height 5 ft 6.34 in / 168.5 cm      Weight 354 lbs 4.468 oz / 160.7 kg      BSA 2.84 m2      BMI 56.6 kg/m2      Temperature 97.8 F / 36.56 C - Temporal      Pulse 91      Blood Pressure 142/789 Sitting, Left Arm      Pulse Oximetry 98%            Pain Score      Experiencing any pain?:  No      Pain Scale Used:  Numerical      Pain Intensity:  0            Fatigue Score      Experiencing any fatigue?:  No      Fatigue (0-10 scale):  0 (none)            General Appearance:  Alert, Oriented X3, Cooperative      Eyes:  Anicteric Sclerae, Moist Conjunctiva, PERRLA      HEENT:  Orophraynx clear, No Erythema, No Exudates      Neck:  Supple, Full ROM, No Masses or JVD      Respiratory:  CTAB, No Diminished Breath, No Rales      Breast\Chest:  Symmetrical, No Nipple Discharge, No Masses      Abdomen\Gastro:  Soft, No NABS      Cardio:  RRR, No Murmur, No, Peripheral Edema, Normal PMI      Skin:  Normal Temperature, Normal Tone, Normal Texture and Turgor      Psychiatric:  Appropriate Affect, Intact Judgement, AAO x3      Neuro:  Cranial Nerve II-XII Inta, No Focal Sensory Deficit      Muscularskeletal:  Normal Gait and Station, Full ROM of extremeties, Full     muscle strength\tone      Extremities:  No Digital Cyanosis, No Digital Ischemia, Pedal Pulses Intact      Lymphatic:  No Cervical, No Supraclavicular, No Infraclavicular, No Axillary            PREVENTION      Hx Influenza Vaccination:  No      Influenza Vaccine Declined:  Yes      2 or More Falls Past Year?:  No      Fall Past Year with Injury?:  No      Hx Pneumococcal Vaccination:  No      Encouraged to follow-up with:  PCP regarding preventative exams.      Chart initiated by      Adrienne Freeman cma            IMPRESSION/PLAN      Plan      -Bladder cancer      -Stage II pT2 N0.      -Tumor size 4.2 x 3.0 x 1.6 cm.  Tumor histologic type invasive urothelial     carcinoma.  High-grade.  Fungating papillary mass.  Tumor invades muscularis      propria.  Lymphovascular invasion not identified. 17 lymph nodes negative for     carcinoma.       -Discussed with patient risk and benefits of systemic chemotherapy for stage II     bladder cancer for adjuvant treatment.  Discussed with patient the pathology of     high grade cancer.  Also counseled with patient's urologist Dr. Cueva.        -After discussion with patient.  Patient stated that he would like to proceed     with adjuvant chemotherapy to decrease his risk of cancer recurrence.      -Patient completed 70 mg/m  cisplatin and gemcitabine 1250 mg/m  as adjuvant     treatment for most up-to-date NCCN guidelines for adjuvant treatment.       -Currently in remission            -Acute on chronic kidney injury      -Improved      -Check no function today.            -Cholecystitis      -Status post cholecystectomy      -intermittent nausea      -Resolved            -Cancer associated pain      -Patient's pain has remained stable.      -Continue current pain regimen.            -Cancer associated fatigue      -Patient's fatigue has remained stable.      -Recommended increased physical activity and improved nutrition            -Anxiety due to cancer      -Patient's anxiety is well controlled today.       -Continue current management.             -Today's Assessment      -ECOG 1.       -Patient's imaging exams, blood tests, physicians' notes, and any new findings     since our last visit were reviewed today to reassess patient's medical     treatment plan. .       -Old medical records were reviewed and summarized in chronological order in the     HPI today to maintain an updated medical record.       -Patient's radiology imaging tests from our last visit were reviewed     independently by me by direct visualization of the images.        -Patients current lab tests and medications were carefully reviewed to evaluate     patient's current treatment plan today.       -Patient was advised to call us right away if  there are any new symptoms for an     urgent visit for further evaluation. Patient voiced understanding and agreed to     do so.            Diagnosis      Bladder cancer - C67.9            Notes      New Diagnostics      * CMP Comp Metabolic Panel, Stat       Dx: Bladder cancer - C67.9      * CBC, As Soon As Possible       Dx: Bladder cancer - C67.9            Patient Education      Patient Education Provided:  Yes      Over 50% Time Counseling Pt:  Yes                 Disclaimer: Converted document may not contain table formatting or lab diagrams. Please see [facility] for the authenticated document.

## 2021-05-28 NOTE — PROGRESS NOTES
Patient: SEVERS,ERICH     Acct: [id number]     Report: #[id number]  UNIT #: [id number]     : 1985    Encounter Date:2019  PRIMARY CARE: ALONZO YARBROUGH  ***Signed***  --------------------------------------------------------------------------------------------------------------------  NURSE INTAKE      Visit Type      Established Patient Visit            Chief Complaint      BLADDER CANCER            Referring Provider/Copies To      Referring Provider:  SUKHDEEP POSADA      Primary Care Provider:  ALONZO YARBROUGH            History and Present Illness      Past Oncology Illness History            1)  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 Anterior dome/neck, posterior wall, left     wall/neck. TURP. Final path: High grade papillary mass urothelial (transitional     cell)  cancer. 4.2 x 3.0 x 1.6 cm  Non-invasive. Muscular propria present only     to the left wall/neck with no invasion s/p bladder resection.  17 LN's negative.     Staging at least pTa.            Treatment History:             -.  Urinary bladder: Anterior dome/neck, posterior wall, left     wall/neck. : TURP. Final path: High grade papillary mass urothelial     (transitional cell)  cancer. 4.2 x 3.0 x 1.6 cm  Non-invasive. Muscular propria     present only to the left wall/neck with no invasion s/p bladder resection.  17     LN's negative.  Staging at least pTa. B. Bladder.       -2017. CT CAP: 1. Lobulated urinary bladder mass measuring up to 9.8    cm. This finding is highly concerning for transitional cell carcinoma of the     urinary bladder. There is no clear evidence of extension beyond the external     surface of the urinary bladder and there is no evidence of distal metastasis in     the abdomen or the pelvis or the lung bases.  2. Hepatic steatosis.3. Exophytic     hemorrhagic left kidney cyst.      2017. RAR C  with orthotopic ileal neobladder.       -May 3, 2017 -.  Completed 4 cycles of cisplatin and gemcitabine.            HPI - Oncology Interim      Presents for follow up for bladder cancer and chronic issue:             1) Bladder Cancer:  Diagnosed: 17. Anterior dome/neck, posterior wall, left    wall/neck. : TURP. Final path: High grade papillary mass urothelial     (transitional cell)  cancer. 4.2 x 3.0 x 1.6 cm  Non-invasive. Muscular propria     present only to the left wall/neck with no invasion s/p bladder resection.  17     LN's negative.  Staging at least pTa.             Reports he follows with Dr. Cueva's office every 3 months for follow up. Last C    T in January showed no evidence of any worsening or metastatic disease. Saw the     urology NP a few days ago and follow up scheduled with Dr. Cueva very soon.     Patient does report had a recent hemorrhoidectomy and is recovering from this.     Today, he reports ongoing incontinence issues related to his diagnosis and     working with Dr. Cueva's office with this problem. He denies any hematuria.             2. CKD: BUN 27, Creatinine 1.37. This is stable. Encourage oral intake of     fluids.             3. Hyponatremia: 133 on  labs. Improved on  labs. NA level returned to     normal 139. Will monitor this for now.             4. Anemia: Hemoglobin level of 13.70. This is stable. Denies any GI bleeding of     any sorts hematuria or worsening fatigue.            Clinical Trial Participant      No            ECOG Performance Status      1            Most Recent Imaging Findings      [facility]                PACS RADIOLOGY REPORT            Patient: SEVERS,ERICH   Acct: #[id number]   Report: #[id number]            UNIT #: [id number]    DOS: 19       INSURANCE:[employer]   ORDER #:[id number]      LOCATION:[facility]     : 1985            PROVIDERS       ADMITTING:     ATTENDING: SUKHDEEP POSADA      FAMILY:  NONE,MD   ORDERING:  SUKHDEEP POSADA         OTHER:    DICTATING:  ROLANDO VOGEL MD            REQ #:[id number]   EXAM:[facility] - CT ABD PEL w wo CHST w      REASON FOR EXAM:        REASON FOR VISIT:  BLADDER CA            *******Signed******         PROCEDURE:   CT ABDOMEN AND PELVIS WITH   CONTRAST             COMPARISON:   [facility], CT, ABDOMEN PELVIS W/WO CONTRAST,     2/01/2018, 8:57.             INDICATIONS:   BLADDER CANCER.  Restaging.  Observation for metastatic disease.             TECHNIQUE:   After obtaining the patient's consent, CT images were obtained     without and with       non-ionic intravenous contrast material.  Initial pre contrast imaging of the     abdomen and pelvis       was obtained.  Upon injection of intravenous contrast, the injection line     failed.  The patient was       administered a small amount of intravenous contrast at this time.  The was est    imated that the       patient received approximately 50 cc of contrast.  The patient was administered     an additional 50 cc       of intravenous contrast and the post-contrast imaging portion of this study was     then obtained.             PROTOCOL:     Standard imaging protocol performed                RADIATION:     DLP: 6507mGy*cm          Automated exposure control was utilized to minimize radiation dose.       CONTRAST:   97cc Isovue 370 I.V.      LABS:     eGFR: 54.2ml/min/1.73m2             FINDINGS:         Chest:  No adenopathy in the chest.  No suspicious pulmonary nodules.  No     pleural fluid or       pneumothorax.             Abdomen without contrast:  Diffuse hepatic steatosis.  Previous cholecystectomy.     No radiodense       urinary system calculus.             Pelvis without contrast:  No radiodense bladder calculus.             Abdomen with contrast:  Liver measures 25.7 cm in length.  No liver or splenic     lesion.  Kidneys       enhance  symmetrically and show symmetric excretion of contrast.  4.1 cm     proteinaceous or       hemorrhagic cyst exophytic from the left mid kidney is similar to the prior.      Ureters are patent.        No hydronephrosis.  Adrenal glands, pancreas are unremarkable.  Bowel loops are     nondilated.        Appendix is normal.  Scattered small retroperitoneal lymph nodes are unchanged.             Pelvis with contrast:  Patient is status post cystectomy with neobladder.  No     bladder mass.  The       neobladder is filled with contrast opacified urine.  No pelvic mass or free     pelvic fluid.  A small,       presumed lymphocele in the right external iliac region measures 2.1 cm and is     unchanged.             No aggressive appearing bone lesion.             CONCLUSION:   Status post cystoprostatectomy with neobladder.  No     hydronephrosis.             No convincing evidence for metastatic disease in the chest, abdomen, or pelvis.             Hepatic megaly with diffuse steatosis.             4.1 cm proteinaceous or hemorrhagic cyst in the left kidney is not significantly    changed.              ROLANDO VOGEL MD             Electronically Signed and Approved By: ROLANDO VOGEL MD on 1/17/2019 at 13:03            PAST, FAMILY   Past Medical History      Past Medical History:  Hypertension      Hematology/Oncology (M):  Bladder Cancer            Social History      Lives independently:  Yes      Occupation:  unemployed            Tobacco Use      Tobacco status:  Never smoker            Alcohol Use      Alcohol intake:  None            Substance Use      Substance use:  Denies use            REVIEW OF SYSTEMS      General:  Denies: Appetite Change, Fatigue, Fever, Night Sweats, Weight Gain,     Weight Loss      Eye:  Denies: Blurred Vision, Corrective Lenses, Diplopia, Eye Irritation, Eye     Pain, Eye Redness, Spots in Vision, Vision Loss      ENT:  Denies: Headache, Hearing Loss, Hoarseness, Seizures, Sinus Congestion,      Sore Throat      Cardiovascular:  Denies: Chest Pain, Edema Ankles, Edema Legs, Irregular     Heartbeat, Palpitations      Respiratory:  Denies: Coughing Blood, Productive Cough, Shortness of Air,     Wheezing      Gastrointestinal:  Denies: Bloody Stools, Constipation, Diarrhea, Frequent     Heartburn, Nausea, Problem Swallowing, Tarry Stools, Unable to Control Bowels,     Vomiting      Genitourinary (male):  Denies: Blood in Urine, Decrease Urine Stream, Frequent     Urination, Incontinence, Painful Urination      Musculoskeletal:  Denies: Back Pain, Leg Cramps, Muscle Pain, Muscle Weakness,     Painful Joints, Swollen Joints      Integumentary:  Denies: Bleeds Easily, Bruises Easily, Hair Changes, Jaundice,     Lesions, Mole Changes, Nail Changes, Pigment Changes, Rash, Skin Discoloration      Neurologic:  Denies: Dizziness, Fainting, Numbness\Tingling, Paralysis, Seizures      Psychiatric:  Denies: Anxiety, Confused, Depression, Disoriented, Memory Loss      Endocrine:  Denies: Cold Intolerance, Diabetes, Excessive Sweating, Excessive     Thirst, Excessive Urination, Heat Intolerance, Flushing, Hyperthyroidism, Hyp    othyroidism      Hematologic/Lymphatic:  Denies: Bruising, Bleeding, Enlarged Lymph Nodes,     Recurrent Infections, Transfusions            VITAL SIGNS AND SCORES      Vitals      Height 5 ft 6.34 in / 168.5 cm      Weight 375 lbs 3.567 oz / 170.2 kg      BSA 2.62 m2      BMI 59.9 kg/m2      Temperature 98.2 F / 36.78 C - Temporal      Pulse 79      Respirations 20      Blood Pressure 149/92 Sitting, Left Arm      Pulse Oximetry 96%, RM AIR            Pain Score      Experiencing any pain?:  No      Pain Scale Used:  Numerical      Pain Intensity:  0            Fatigue Score      Experiencing any fatigue?:  No      Fatigue (0-10 scale):  0 (none)            EXAM      General Appearance:  Positive for: Alert, Oriented x3, Cooperative      Eye:  Positive for: Moist Conjunctiva, PERRLA, Reactive  to Light      HEENT:  Positive for: Oropharynx clear;          Negative for: Erythema      Neck:  Positive for: Full ROM, Supple      Respiratory:  Positive for: CTAB, Normal Respiratory Effort      Abdomen/Gastro:  Positive for: Normal Active Bowel Sounds, Soft;          Negative for: Distention, Tenderness      Cardiovascular:  Positive for: RRR;          Negative for: Murmur, Peripheral Edema      Skin:  Positive for: Normal Temperature, Normal Texture and Turgor, Normal Tone;             Negative for: Rash      Psychiatric:  Positive for: AAO X 3, Appropriate Affect, Intact Judgement      Neurologic:  Positive for: Cranial Ner II-XII Intact;          Negative for: Deep Tendon Reflexes, Dizziness      Genitourinary:  Negative for: Bladder Distention      Musculoskeletal:  Positive for: Full ROM Lower Extremety, Full ROM Upper     Extremety, Full Muscle Strength      Lower Extremities:  Positive for: Normal Gait and Station, Pedal Pulses Intact,     Pedal Pulses Symetrical      Lymphatic:  Negative for: Axillary, Cervical, Supraclavicular            PREVENTION      Influenza Vaccine Declined:  Yes      2 or More Falls Past Year?:  No      Fall Past Year with Injury?:  No      Encouraged to follow-up with:  PCP regarding preventative exams.      Chart initiated by      JERALD ZAPATA [facility]            ALLERGY/MEDS      Allergies      Coded Allergies:             NO KNOWN ALLERGIES (Unverified , 3/28/19)            Medications      Last Reconciled on 4/17/19 15:39 by ROBERTO BRUCE      Docusate Sodium (Docusate Sodium) 60 Mg/15 Ml Syrup      240 MG PO QDAY, ML         Reported         4/1/19       Acetaminophen/Hydrocodone 7.5/325 (Acetaminophen/Hydrocodone 7.5/325) 1 Tab     Tablet      1-2 TAB PO Q6H PRN for PAIN, TAB         Reported         4/1/19       FLUoxetine HCl (FLUoxetine HCl) 40 Mg Capsule      40 MG PO QDAY, CAP         Reported         3/28/19       Lisinopril* (Lisinopril*) 10 Mg Tablet      10 MG  PO QDAY, #30 TAB 0 Refills         Reported         3/28/19      Medications Reviewed:  No Changes made to meds            IMPRESSION/PLAN      Impression      1) Bladder Cancer:  Diagnosed: 1/26/17. Anterior dome/neck, posterior wall, left    wall/neck. : TURP. Final path: High grade papillary mass urothelial     (transitional cell)  cancer. 4.2 x 3.0 x 1.6 cm  Non-invasive. Muscular propria     present only to the left wall/neck with no invasion s/p bladder resection.  17     LN's negative.  Staging at least pTa.             Reports he follows with Dr. Cueva's office every 3 months for follow up. Last     CT in January showed no evidence of any worsening or metastatic disease. Saw the    urology NP a few days ago and follow up scheduled with Dr. Cueva very soon.     Patient does report had a recent hemorrhoidectomy and is recovering from this.     Today, he reports ongoing incontinence issues related to his diagnosis and     working with Dr. Cueva's office with this problem. He denies any hematuria.     Discussion with Dr. Celeste regarding surveillance schedule and close follow     up with urology.             2. CKD: BUN 27, Creatinine 1.37. This is stable. Encourage oral intake of     fluids. Will follow this.             3. Hyponatremia: 133 on 4/2 labs. Improved on 4/17 labs. NA level returned to     normal 139. Will monitor this for now.             4. Anemia: Hemoglobin level of 13.70. This is stable. Denies any GI bleeding of     any sorts hematuria or worsening fatigue. This is stable.            Diagnosis      Bladder cancer         Malignant neoplasm of urinary bladder, unspecified site - C67.9         Bladder location: unspecified site            Notes      New Diagnostics      * CMP Comp Metabolic Panel, Routine         Dx: Bladder cancer - C67.9            Plan      1. Repeat CMP today to evaluate for worsening hyponatremia. Follow up with Dr. Cueva as scheduled for bladder cancer and  cystoscopies. After discussion with     Dr. Celeste, this patient will follow up with medical oncology PRN and keep     close surveilance with urology with Dr. Cueva's office. We explained he can     call and follow up with us PRN should he have any problems or is unable to get     into to see Dr. Cueva.             2. Will refer to PCP for continued monitoring for CKD and appropriate referral     to nephology as needed.             3. Hyponatremia improved.            4. Anemia is stable.             ATTENDING ADDENDUM: I personally saw and examined the patient; I performed key     or critical portions of the E and M service required for supervisory billing; I     did explain to the patient today that surveillance with urology is of the utmost    importance.  I will otherwise have him return on an as-needed basis as he will     be continuing with close surveillance with urology.  He will call with any new     or worsening symptoms otherwise.            Patient Education      Patient Education Provided:  Yes            Topics Patient Counseled on      hyponatremia       close surveilance with urology                 Disclaimer: Converted document may not contain table formatting or lab diagrams. Please see [facility] for the authenticated document.

## 2022-12-02 ENCOUNTER — HOSPITAL ENCOUNTER (EMERGENCY)
Facility: HOSPITAL | Age: 37
Discharge: HOME | End: 2022-12-03
Attending: EMERGENCY MEDICINE | Admitting: EMERGENCY MEDICINE

## 2022-12-02 DIAGNOSIS — J10.1 INFLUENZA A: Primary | ICD-10-CM

## 2022-12-02 DIAGNOSIS — F19.10 SUBSTANCE ABUSE: ICD-10-CM

## 2022-12-02 LAB
ALBUMIN SERPL-MCNC: 4.27 G/DL (ref 3.5–5.2)
ALBUMIN/GLOB SERPL: 1.3 G/DL
ALP SERPL-CCNC: 68 U/L (ref 39–117)
ALT SERPL W P-5'-P-CCNC: 21 U/L (ref 1–41)
AMPHET+METHAMPHET UR QL: NEGATIVE
AMPHETAMINES UR QL: NEGATIVE
ANION GAP SERPL CALCULATED.3IONS-SCNC: 11 MMOL/L (ref 5–15)
APAP SERPL-MCNC: <5 MCG/ML (ref 0–30)
APTT PPP: 32.9 SECONDS (ref 26.5–34.5)
AST SERPL-CCNC: 21 U/L (ref 1–40)
BARBITURATES UR QL SCN: NEGATIVE
BASOPHILS # BLD AUTO: 0.04 10*3/MM3 (ref 0–0.2)
BASOPHILS NFR BLD AUTO: 0.3 % (ref 0–1.5)
BENZODIAZ UR QL SCN: NEGATIVE
BILIRUB SERPL-MCNC: 0.2 MG/DL (ref 0–1.2)
BILIRUB UR QL STRIP: NEGATIVE
BUN SERPL-MCNC: 29 MG/DL (ref 6–20)
BUN/CREAT SERPL: 19.6 (ref 7–25)
BUPRENORPHINE SERPL-MCNC: NEGATIVE NG/ML
CALCIUM SPEC-SCNC: 8.8 MG/DL (ref 8.6–10.5)
CANNABINOIDS SERPL QL: NEGATIVE
CHLORIDE SERPL-SCNC: 111 MMOL/L (ref 98–107)
CLARITY UR: CLEAR
CO2 SERPL-SCNC: 22 MMOL/L (ref 22–29)
COCAINE UR QL: NEGATIVE
COLOR UR: YELLOW
CREAT SERPL-MCNC: 1.48 MG/DL (ref 0.76–1.27)
DEPRECATED RDW RBC AUTO: 51.4 FL (ref 37–54)
EGFRCR SERPLBLD CKD-EPI 2021: 62.5 ML/MIN/1.73
EOSINOPHIL # BLD AUTO: 0.1 10*3/MM3 (ref 0–0.4)
EOSINOPHIL NFR BLD AUTO: 0.8 % (ref 0.3–6.2)
ERYTHROCYTE [DISTWIDTH] IN BLOOD BY AUTOMATED COUNT: 15 % (ref 12.3–15.4)
ETHANOL BLD-MCNC: <10 MG/DL (ref 0–10)
ETHANOL UR QL: <0.01 %
FLUAV SUBTYP SPEC NAA+PROBE: DETECTED
FLUBV RNA ISLT QL NAA+PROBE: NOT DETECTED
GLOBULIN UR ELPH-MCNC: 3.3 GM/DL
GLUCOSE SERPL-MCNC: 77 MG/DL (ref 65–99)
GLUCOSE UR STRIP-MCNC: NEGATIVE MG/DL
HCT VFR BLD AUTO: 48.8 % (ref 37.5–51)
HGB BLD-MCNC: 15.4 G/DL (ref 13–17.7)
HGB UR QL STRIP.AUTO: NEGATIVE
IMM GRANULOCYTES # BLD AUTO: 0.08 10*3/MM3 (ref 0–0.05)
IMM GRANULOCYTES NFR BLD AUTO: 0.6 % (ref 0–0.5)
INR PPP: 0.95 (ref 0.9–1.1)
KETONES UR QL STRIP: NEGATIVE
LEUKOCYTE ESTERASE UR QL STRIP.AUTO: NEGATIVE
LYMPHOCYTES # BLD AUTO: 2.01 10*3/MM3 (ref 0.7–3.1)
LYMPHOCYTES NFR BLD AUTO: 16.2 % (ref 19.6–45.3)
MAGNESIUM SERPL-MCNC: 2 MG/DL (ref 1.6–2.6)
MCH RBC QN AUTO: 29.4 PG (ref 26.6–33)
MCHC RBC AUTO-ENTMCNC: 31.6 G/DL (ref 31.5–35.7)
MCV RBC AUTO: 93.3 FL (ref 79–97)
METHADONE UR QL SCN: NEGATIVE
MONOCYTES # BLD AUTO: 0.62 10*3/MM3 (ref 0.1–0.9)
MONOCYTES NFR BLD AUTO: 5 % (ref 5–12)
NEUTROPHILS NFR BLD AUTO: 77.1 % (ref 42.7–76)
NEUTROPHILS NFR BLD AUTO: 9.52 10*3/MM3 (ref 1.7–7)
NITRITE UR QL STRIP: NEGATIVE
NRBC BLD AUTO-RTO: 0 /100 WBC (ref 0–0.2)
OPIATES UR QL: NEGATIVE
OXYCODONE UR QL SCN: NEGATIVE
PCP UR QL SCN: NEGATIVE
PH UR STRIP.AUTO: 6.5 [PH] (ref 5–8)
PLATELET # BLD AUTO: 257 10*3/MM3 (ref 140–450)
PMV BLD AUTO: 9.9 FL (ref 6–12)
POTASSIUM SERPL-SCNC: 4.2 MMOL/L (ref 3.5–5.2)
PROPOXYPH UR QL: NEGATIVE
PROT SERPL-MCNC: 7.6 G/DL (ref 6–8.5)
PROT UR QL STRIP: ABNORMAL
PROTHROMBIN TIME: 12.8 SECONDS (ref 12.1–14.7)
RBC # BLD AUTO: 5.23 10*6/MM3 (ref 4.14–5.8)
SALICYLATES SERPL-MCNC: <0.3 MG/DL
SARS-COV-2 RNA PNL SPEC NAA+PROBE: NOT DETECTED
SODIUM SERPL-SCNC: 144 MMOL/L (ref 136–145)
SP GR UR STRIP: 1.01 (ref 1–1.03)
TRICYCLICS UR QL SCN: NEGATIVE
UROBILINOGEN UR QL STRIP: ABNORMAL
WBC NRBC COR # BLD: 12.37 10*3/MM3 (ref 3.4–10.8)

## 2022-12-02 PROCEDURE — 36415 COLL VENOUS BLD VENIPUNCTURE: CPT

## 2022-12-02 PROCEDURE — C9803 HOPD COVID-19 SPEC COLLECT: HCPCS

## 2022-12-02 PROCEDURE — 85025 COMPLETE CBC W/AUTO DIFF WBC: CPT | Performed by: NURSE PRACTITIONER

## 2022-12-02 PROCEDURE — 87636 SARSCOV2 & INF A&B AMP PRB: CPT | Performed by: NURSE PRACTITIONER

## 2022-12-02 PROCEDURE — 80179 DRUG ASSAY SALICYLATE: CPT | Performed by: NURSE PRACTITIONER

## 2022-12-02 PROCEDURE — 82077 ASSAY SPEC XCP UR&BREATH IA: CPT | Performed by: NURSE PRACTITIONER

## 2022-12-02 PROCEDURE — 85610 PROTHROMBIN TIME: CPT | Performed by: NURSE PRACTITIONER

## 2022-12-02 PROCEDURE — 80143 DRUG ASSAY ACETAMINOPHEN: CPT | Performed by: NURSE PRACTITIONER

## 2022-12-02 PROCEDURE — 85730 THROMBOPLASTIN TIME PARTIAL: CPT | Performed by: NURSE PRACTITIONER

## 2022-12-02 PROCEDURE — 80306 DRUG TEST PRSMV INSTRMNT: CPT | Performed by: NURSE PRACTITIONER

## 2022-12-02 PROCEDURE — 83735 ASSAY OF MAGNESIUM: CPT | Performed by: NURSE PRACTITIONER

## 2022-12-02 PROCEDURE — 80053 COMPREHEN METABOLIC PANEL: CPT | Performed by: NURSE PRACTITIONER

## 2022-12-02 PROCEDURE — 81003 URINALYSIS AUTO W/O SCOPE: CPT | Performed by: NURSE PRACTITIONER

## 2022-12-02 PROCEDURE — 99284 EMERGENCY DEPT VISIT MOD MDM: CPT

## 2022-12-02 RX ORDER — CLONIDINE HYDROCHLORIDE 0.1 MG/1
0.2 TABLET ORAL ONCE
Status: DISCONTINUED | OUTPATIENT
Start: 2022-12-02 | End: 2022-12-02

## 2022-12-03 VITALS
HEIGHT: 70 IN | BODY MASS INDEX: 44.38 KG/M2 | RESPIRATION RATE: 18 BRPM | WEIGHT: 310 LBS | OXYGEN SATURATION: 96 % | SYSTOLIC BLOOD PRESSURE: 153 MMHG | TEMPERATURE: 97.3 F | HEART RATE: 100 BPM | DIASTOLIC BLOOD PRESSURE: 102 MMHG

## 2022-12-03 RX ORDER — BALOXAVIR MARBOXIL 80 MG/1
1 TABLET, FILM COATED ORAL ONCE
Qty: 1 EACH | Refills: 0 | Status: SHIPPED | OUTPATIENT
Start: 2022-12-03 | End: 2022-12-03

## 2022-12-03 NOTE — NURSING NOTE
"38 y/o brought to hospital for psych eval by \"police\". Pt advised he has been living at [facility], since 11-11-22 for Alcohol Sobriety. Pt advised that he has been taking Dextromethorphan since age 15, consuming at least 365mg daily. Pt advised that he sometimes takes more and today consumed approx. 2000mg throughout the day with last dosing around 5pm. Pt advised staff was concerned with his \"bizarre behavior\". Pt advised that he takes the medicine to \"go inside himself\" and reports astral traveling. Pt denies SI/HI. Pt states does not want to stop taking the Dextromethorphan.  Denies anxiety or depression.    CBC     WBC - 12.37    BUN 29  Creat. 1.48  Chl 111      Influenza A - Detected        "

## 2022-12-03 NOTE — ED NOTES
"         MEDICAL SCREENING:    Reason for Visit: Patient reports that he drank a lot of cough medicine to \"astral travel.\"  Denies SI    Patient initially seen in triage.  The patient was advised further evaluation and diagnostic testing will be needed, some of the treatment and testing will be initiated in the lobby in order to begin the process.  The patient will be returned to the waiting area for the time being and possibly be re-assessed by a subsequent ED provider.  The patient will be brought back to the treatment area in as timely manner as possible.         Hollis Castle, APRN  12/02/22 1906    "

## 2022-12-03 NOTE — NURSING NOTE
Reviewed discharge instructions with pt. Advised of rx sent to [facility] [place] for influenza. Educated on discharge instructions. Avoid exceeding dosing recommendations listed on box for Dextromethorphan. Instructed to return if needed.

## 2022-12-03 NOTE — NURSING NOTE
Spoke with provider Tammy Atkinson regarding Dextromethorphan - she advised that nursing can contact poison control. I spoke with [place], poison control and made aware that pt stated he took approx. 2000mg over the course of today with last dose around 5 pm. Provided her with pt clinical status and labs. She advised toxic dose for pt. Weight of 310 lbs is 1056 mg. S/S toxicity - Ataxia, Nystagmus, Htn, delirium, seizures. Pt can be medically cleared 6 hrs after consumption, and if stable. Made provider aware.

## 2022-12-09 NOTE — ED PROVIDER NOTES
"Subjective   History of Present Illness  Patient is a 37-year-old male with significant past medical history positive for alcoholism, anxiety, depression, hypertension, prostate cancer presenting to the ER for psychiatric evaluation of substance abuse. Patient advised he has been living at [facility], since 11-11-22 for Alcohol Sobriety. Pt advised that he has been taking Dextromethorphan since age 15, consuming at least 365mg daily. Pt advised that he sometimes takes more and today consumed approx. 2000mg throughout the day with last dosing around 5pm. Pt advised staff was concerned with his \"bizarre behavior\". Pt advised that he takes the medicine to \"go inside himself\" and reports astral traveling. Pt denies SI/HI. Pt states does not want to stop taking the Dextromethorphan.  Denies anxiety or depression.    History provided by:  Patient   used: No        Review of Systems   Constitutional: Negative.  Negative for fever.   HENT: Negative.    Respiratory: Negative.    Cardiovascular: Negative.  Negative for chest pain.   Gastrointestinal: Negative.  Negative for abdominal pain.   Endocrine: Negative.    Genitourinary: Negative.  Negative for dysuria.   Skin: Negative.    Neurological: Negative.    Psychiatric/Behavioral: Negative.    All other systems reviewed and are negative.      Past Medical History:   Diagnosis Date   • Alcohol abuse    • Alcoholism (HCC)    • Anxiety    • Bipolar disorder (HCC)    • Depression    • HTN (hypertension)    • Prostate cancer (HCC)     2017   • Psychiatric illness        No Known Allergies    Past Surgical History:   Procedure Laterality Date   • APPENDECTOMY     • BLADDER SURGERY      2017   • PROSTATECTOMY      2017   • TONSILLECTOMY         Family History   Problem Relation Age of Onset   • Depression Mother    • Bipolar disorder Father        Social History     Socioeconomic History   • Marital status: Single   • Number of " "children: 1   • Years of education: 14   • Highest education level: 12th grade   Tobacco Use   • Smoking status: Some Days     Packs/day: 0.25     Years: 22.00     Pack years: 5.50     Types: Cigarettes   • Smokeless tobacco: Former   Vaping Use   • Vaping Use: Never used   Substance and Sexual Activity   • Alcohol use: Not Currently     Comment: Vodka 1/2 gallon daily - stopped 11-11-22   • Drug use: Not Currently     Types: Amphetamines, Codeine, Marijuana, Oxycodone, Barbiturates, Benzodiazepines, \"Crack\" cocaine, Cocaine(coke), LSD, Hydrocodone   • Sexual activity: Not Currently           Objective   Physical Exam  Vitals and nursing note reviewed.   Constitutional:       General: He is not in acute distress.     Appearance: He is well-developed. He is not diaphoretic.   HENT:      Head: Normocephalic and atraumatic.      Right Ear: External ear normal.      Left Ear: External ear normal.      Nose: Nose normal.   Eyes:      Conjunctiva/sclera: Conjunctivae normal.      Pupils: Pupils are equal, round, and reactive to light.   Neck:      Vascular: No JVD.      Trachea: No tracheal deviation.   Cardiovascular:      Rate and Rhythm: Normal rate and regular rhythm.      Heart sounds: Normal heart sounds. No murmur heard.  Pulmonary:      Effort: Pulmonary effort is normal. No respiratory distress.      Breath sounds: Normal breath sounds. No wheezing.   Abdominal:      General: Bowel sounds are normal. There is distension.      Palpations: Abdomen is soft.      Tenderness: There is no abdominal tenderness.   Musculoskeletal:         General: No deformity. Normal range of motion.      Cervical back: Normal range of motion and neck supple.   Skin:     General: Skin is warm and dry.      Coloration: Skin is not pale.      Findings: No erythema or rash.   Neurological:      Mental Status: He is alert and oriented to person, place, and time.      Cranial Nerves: No cranial nerve deficit.   Psychiatric:         Behavior: " Behavior normal.         Thought Content: Thought content normal.         Procedures           ED Course                                           MDM    Final diagnoses:   Influenza A   Substance abuse (HCC)       ED Disposition  ED Disposition     ED Disposition   Discharge    Condition   Stable    Comment   --             PATIENT CONNECTION - [facility]  See Provider List  [place] [address]  [phone]  Schedule an appointment as soon as possible for a visit   As needed    [facility]  [address] [place] [id number] [phone]  Schedule an appointment as soon as possible for a visit in 1 day           Medication List      ASK your doctor about these medications    Xofluza (80 MG Dose) 1 x 80 MG tablet therapy pack  Generic drug: Baloxavir Marboxil(80 MG Dose)  Take 1 tablet by mouth 1 (One) Time for 1 dose.  Ask about: Should I take this medication?           Where to Get Your Medications      These medications were sent to [facility] [address] [place] [address] - [phone] [place] [phone] [address] [address]    Phone: [phone]   · Xofluza (80 MG Dose) 1 x 80 MG tablet therapy pack          Tammy Atkinson, APRN  12/08/22 1933